# Patient Record
Sex: FEMALE | Race: WHITE | Employment: FULL TIME | URBAN - METROPOLITAN AREA
[De-identification: names, ages, dates, MRNs, and addresses within clinical notes are randomized per-mention and may not be internally consistent; named-entity substitution may affect disease eponyms.]

---

## 2019-11-23 ENCOUNTER — OFFICE VISIT (OUTPATIENT)
Dept: URGENT CARE | Facility: URGENT CARE | Age: 24
End: 2019-11-23
Payer: COMMERCIAL

## 2019-11-23 VITALS
DIASTOLIC BLOOD PRESSURE: 72 MMHG | HEART RATE: 121 BPM | OXYGEN SATURATION: 98 % | SYSTOLIC BLOOD PRESSURE: 108 MMHG | WEIGHT: 233 LBS | TEMPERATURE: 98.3 F

## 2019-11-23 DIAGNOSIS — J02.0 STREP THROAT: Primary | ICD-10-CM

## 2019-11-23 LAB
DEPRECATED S PYO AG THROAT QL EIA: ABNORMAL
SPECIMEN SOURCE: ABNORMAL

## 2019-11-23 PROCEDURE — 99203 OFFICE O/P NEW LOW 30 MIN: CPT | Performed by: FAMILY MEDICINE

## 2019-11-23 PROCEDURE — 87880 STREP A ASSAY W/OPTIC: CPT | Performed by: FAMILY MEDICINE

## 2019-11-23 RX ORDER — AMOXICILLIN 500 MG/1
1000 CAPSULE ORAL 2 TIMES DAILY
Qty: 40 CAPSULE | Refills: 0 | Status: SHIPPED | OUTPATIENT
Start: 2019-11-23

## 2019-11-23 NOTE — PROGRESS NOTES
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  This has not happened to her before, generally pretty healthy.    Objective: Vitals are stable, NAD.  ENT is normal.  Neck and sinuses are normal, lungs are clear.  Heart is regular without murmurs.  Abdomen benign.  Strep test positive    Assessment and plan: Long-lasting respiratory infection, second sickening consistent with sinobronchitis, likely bacterial.  Also has strep.  Will treat with amoxicillin.